# Patient Record
(demographics unavailable — no encounter records)

---

## 2024-10-22 NOTE — DISCUSSION/SUMMARY
[de-identified] : After discussing various treatment options with the patient including but not limited to oral medications, physical therapy, exercise modalities as well as interventional spinal injections, we have decided with the following plan:  - Continue home exercises, stretching, activity modification, physical therapy, and conservative care. - Follow-up as needed. - Recommend Tylenol 500-1000mg Q8 hours PRN.

## 2024-10-22 NOTE — PHYSICAL EXAM
[de-identified] : Constitutional; Appears well, no apparent distress Ability to communicate: Normal  Respiratory: non-labored breathing Skin: No rash noted Head: Normocephalic, atraumatic Neck: no visible thyroid enlargement Eyes: Extraocular movements intact Neurologic: Alert and oriented x3 Psychiatric: normal mood, affect and behavior [] : non-antalgic

## 2024-10-22 NOTE — HISTORY OF PRESENT ILLNESS
[Lower back] : lower back [Dull/Aching] : dull/aching [Localized] : localized [Intermittent] : intermittent [Nothing helps with pain getting better] : Nothing helps with pain getting better [Standing] : standing [Walking] : walking [5] : 5 [2] : 2 [FreeTextEntry1] : 10/22/2024: s/p B/L L3-L5 RFA on 10/09/2024 with >50% relief and improvement of ADLs. Has been feeling better since injection. Able to walk around more for longer periods.   07/02/2024: s/p first and second diagnostic B/L L3,L4,L5 MBB on 5/28/24 and 06/19/24 with >80% relief and improvement of ADLs.  Initial HPI 05/06/2024: Pain started many years ago and getting progressively worse. Pain is on the BILATERAL lower back described as an achy/tight pain worse with standing for long periods. Saw Dr. Lenz who recommended MBBs.   X-rays lumbar spine independently reviewed: facet arthropathy and scoliosis  MRI Lumbar Spine 4/4/24 independently reviewed: multilevel facet arthropathy and DDD; L4-5 spondy Pain Medications: lidocaine patches   Past Injections: TPIs with temporary relief  Spine surgery: none  Blood thinners: *pt takes 81mg Aspirin [] : no [de-identified] : L MRI  [TWNoteComboBox1] : 50%